# Patient Record
Sex: FEMALE | Race: WHITE
[De-identification: names, ages, dates, MRNs, and addresses within clinical notes are randomized per-mention and may not be internally consistent; named-entity substitution may affect disease eponyms.]

---

## 2018-08-06 ENCOUNTER — HOSPITAL ENCOUNTER (INPATIENT)
Dept: HOSPITAL 68 - ERH | Age: 43
LOS: 3 days | DRG: 754 | End: 2018-08-09
Attending: PSYCHIATRY & NEUROLOGY | Admitting: PSYCHIATRY & NEUROLOGY
Payer: COMMERCIAL

## 2018-08-06 VITALS — SYSTOLIC BLOOD PRESSURE: 122 MMHG | DIASTOLIC BLOOD PRESSURE: 87 MMHG

## 2018-08-06 VITALS — DIASTOLIC BLOOD PRESSURE: 60 MMHG | SYSTOLIC BLOOD PRESSURE: 110 MMHG

## 2018-08-06 VITALS — HEIGHT: 67 IN | WEIGHT: 195.12 LBS | BODY MASS INDEX: 30.62 KG/M2

## 2018-08-06 VITALS — SYSTOLIC BLOOD PRESSURE: 143 MMHG | DIASTOLIC BLOOD PRESSURE: 86 MMHG

## 2018-08-06 VITALS — SYSTOLIC BLOOD PRESSURE: 124 MMHG | DIASTOLIC BLOOD PRESSURE: 66 MMHG

## 2018-08-06 VITALS — DIASTOLIC BLOOD PRESSURE: 66 MMHG | SYSTOLIC BLOOD PRESSURE: 108 MMHG

## 2018-08-06 VITALS — SYSTOLIC BLOOD PRESSURE: 108 MMHG | DIASTOLIC BLOOD PRESSURE: 66 MMHG

## 2018-08-06 VITALS — SYSTOLIC BLOOD PRESSURE: 109 MMHG | DIASTOLIC BLOOD PRESSURE: 74 MMHG

## 2018-08-06 VITALS — DIASTOLIC BLOOD PRESSURE: 60 MMHG | SYSTOLIC BLOOD PRESSURE: 122 MMHG

## 2018-08-06 VITALS — SYSTOLIC BLOOD PRESSURE: 133 MMHG | DIASTOLIC BLOOD PRESSURE: 76 MMHG

## 2018-08-06 VITALS — SYSTOLIC BLOOD PRESSURE: 109 MMHG | DIASTOLIC BLOOD PRESSURE: 67 MMHG

## 2018-08-06 DIAGNOSIS — Z72.89: ICD-10-CM

## 2018-08-06 DIAGNOSIS — F32.9: Primary | ICD-10-CM

## 2018-08-06 LAB
ABSOLUTE GRANULOCYTE CT: 5 /CUMM (ref 1.4–6.5)
BASOPHILS # BLD: 0.1 /CUMM (ref 0–0.2)
BASOPHILS NFR BLD: 0.7 % (ref 0–2)
EOSINOPHIL # BLD: 0.2 /CUMM (ref 0–0.7)
EOSINOPHIL NFR BLD: 2.4 % (ref 0–5)
ERYTHROCYTE [DISTWIDTH] IN BLOOD BY AUTOMATED COUNT: 14 % (ref 11.5–14.5)
GRANULOCYTES NFR BLD: 50 % (ref 42.2–75.2)
HCT VFR BLD CALC: 40.2 % (ref 37–47)
LYMPHOCYTES # BLD: 4.1 /CUMM (ref 1.2–3.4)
MCH RBC QN AUTO: 31.2 PG (ref 27–31)
MCHC RBC AUTO-ENTMCNC: 33.9 G/DL (ref 33–37)
MCV RBC AUTO: 92.1 FL (ref 81–99)
MONOCYTES # BLD: 0.5 /CUMM (ref 0.1–0.6)
PLATELET # BLD: 272 /CUMM (ref 130–400)
PMV BLD AUTO: 8.2 FL (ref 7.4–10.4)
RED BLOOD CELL CT: 4.37 /CUMM (ref 4.2–5.4)
WBC # BLD AUTO: 9.9 /CUMM (ref 4.8–10.8)

## 2018-08-06 PROCEDURE — G0480 DRUG TEST DEF 1-7 CLASSES: HCPCS

## 2018-08-06 NOTE — IP CRISIS DIAG ASSESS PSYCH
Diagnostic Assessment
 
Basic Assessment
Insurance Authorization:
Insurance #1:
 
Insurance name: SARMAD MCCALL  C[employer]
Phone number: 
Policy number: [id number]
Group number: 
Authorization number: 
 
Authorization requested through the online [facility] portal and was approved.
 
Authorization # [id number]
 
Client Authorization # [id number] 
 
Type of Request INITIAL 
   
 
 
   
  
 
Primary Care Physician:
Patient's PCP: Patient Has No Primary Care Dr
PCP's Phone Number: 
 
Patient's Quote: " I think I hit my breaking point."
Present Illness:
The patient is a 43 year old,   female who presented to the ED 
last evening with worsening symptoms of depression and was intoxicated. She was 
held overnight and evaluated this AM, when her alcohol level was appropriate for
evaluation. She presents alert and oriented, significantly crying with depressed
mood. She states that she 'hit her breaking point. She describes having 
depression for her entire life, however states that she never addressed it. She 
states 3.5 years ago, she had her second son and her depression became worse. 
She states that having him "changed her whole life," and it was a significant 
decision to have him, noting she loves her son very much. She states after 
having her son, that she had brief treatment in [place] however did not 
continue, instead has been self medicating with alcohol. She states that he 
alcohol intake has increased and she believes that she is drinking too much. She
states that she is drinking 2-3 glasses of wine, 4-5 times a week, noting 
sometimes it is more then that. She reports that her depression and anxiety are 
both a 10 out of 10, 10 being the most severe. She states that she is 
overwhelmed and does not know what to do, noting that approximately a month ago,
she impulsively resigned from her job. She states that despite her financial 
responsibilities that "she did not care," and quit her job. She has been 
experiencing anhedonia and does not find kathleen in things. She states that she has 
not been able to pay her rent or any of her bills. She is unsure if she is 
feeling helpless and does know "there is a way out," however states that she 
does not know what that is at this point She has been having difficulty with 
concentration and at times, with motivation. She denies any current SI, however 
states "I feel like it yeah, but I would never do it," noting she loves her 
sons. She states that she has been having difficulty with her sleep and is only 
getting about 3-4 hours of sleep a night. She is unsure of what would be helpful
at this time, however does want to get better for her kids.
 
 
NESHA spoke with the patient friend Gerri Barth ([phone]), for collateral
information. Gerri states that she is a Licence Practicing Counselor and has
been friends with the patient for many years. Gerri states that the patient 
is a single mom and has been increasingly struggling with everything. Gerri 
states that the patient has 2 children, 3 years old and 16 years old, both of 
whom reside with her full time, however both have different fathers, who are 
involved. Gerri notes that the 16 year old is staying with her and the 3 
years old is with his paternal grandparents. Gerri notes that the children 
are well cared for and that there is no concern for abuse or neglect. Gerri 
states that the patient has been drinking more and called her last night to ask 
for help. Gerri states that she had suspected that the patient had a 
drinking problem, however states that the patient is very proud has has never 
asked for help. Gerri states that she has never known the patient to be 
suicidal or homicidal. Gerri states that the patient recently lost her job 
and is not sure if her rent or bills are paid. Gerri thinks that the patient
needs help for her drinking and that unless it is rehab, she does not think the 
patient will follow through.
Patient's Address:
[address] [place] [address]
Home Phone Number: [phone]
Other Phone Number: 
 
Who Do You Live With? Other (see notes) (2 sons)
Feel Safe Where You Live? Yes
Feel Safe in Your Relationship Yes (Not in a relationship)
Marital Status: 
Do You Have Children? Yes
Ages? 16 years old and 2.5 year
Primary Language? English
Family/Informants Interviewed: Friend- Gerri Barth [phone].
Allergies -
Coded Allergies:
shrimp (Severe, ANAPHYLAXIS 16)
amoxicillin (Intermediate, RASH 16)
morphine (Intermediate, RASH 16)
 
Current Medications -
Scheduled PRN Medications
Ibuprofen (Motrin 600 MG Tab) 600 MG TAB   1 TAB PO Q6P PRN PAIN #60 TAB
     Prescribed by Peyman Garnett MD on 09/10/15
Ondansetron (Zofran Odt) 4 MG ODT   1 ODT SL Q6P PRN NAUSEA #12 ODT
     Prescribed by Peyman Garnett MD on 09/10/15
Oxycodone HCl/Acetaminophen (Percocet 5-325 MG Tablet) 1 EACH TABLET   1-2 TAB 
PO Q4-6 PRN PAIN #24 TAB
     Prescribed by Rupert De La Fuente on 16
 
Consequences of Psych Med Use:
N/A
Comment: N/A
Lab Results:
 Laboratory Tests
 
18 0427:
Urine Opiates Screen < 100, Methadone Screen < 40, Barbiturate Screen < 60, Ur 
Phencyclidine Scrn < 6.00, Amphetamines Screen < 100, U Benzodiazepines Scrn < 
85, Urine Cocaine Screen < 50, Urine Cannabis Screen < 5.00, Urine Pregnancy 
Test NEGATIVE
 
18 0110:
Anion Gap 8, Estimated GFR > 60, BUN/Creatinine Ratio 15.0, Glucose 94, Calcium 
8.6, Total Bilirubin 0.2, AST 24, ALT 31, Alkaline Phosphatase 70, Total Protein
7.1, Albumin 4.4, Globulin 2.7, Albumin/Globulin Ratio 1.6, Lipase 168, CBC w 
Diff NO MAN DIFF REQ, RBC 4.37, MCV 92.1, MCH 31.2  H, MCHC 33.9, RDW 14.0, MPV 
8.2, Gran % 50.0, Lymphocytes % 41.4, Monocytes % 5.5, Eosinophils % 2.4, 
Basophils % 0.7, Absolute Granulocytes 5.0, Absolute Lymphocytes 4.1  H, 
Absolute Monocytes 0.5, Absolute Eosinophils 0.2, Absolute Basophils 0.1, Serum 
Alcohol 240.0
 
Toxicology Screen Completed? Yes
Results: negative
Symptoms of Use:
N/A
 
Past History
 
Past Medical History
Medical History: None/Denies
 
Past Surgical History
Surgical History CS, CHOLY tubal ligation
 
Abuse/Trauma History
Trauma History/Current Trauma: Denies
 
Legal History
Current Legal Status: none
Have you ever been arrested? Yes
Number of Arrests: 1
Pending Court Dates:
N/A
 N/A
 
Psychosocial History
Strengths/Capabilities:
The patient has supportive friends and two children that she cares for very
much.
Physical Limitations (Interventions):
None noted
 
Psychiatric Treatment History
Psych Treatment
   Psychiatric Treatment Yes
   Inpatient Treatment No
   Outpatient Treatment Yes
   Location of Treatment Brief treatment in [place]
   Reason for Treatment
Post Partum Depression
   Dates of Treatment 3.5 years ago
   Response to Treatment
She states that she only went briefly and did not continue, as she thought
 that she no longer needed treatment.
Diagnosis by History:
None noted
Risk Factors: high anxiety/distress, substance abuse, poor impulse control
 
Substance Use/Abuse History
Drug Use/Abuse minimum 12mo Hx
   Substances Used/Abused Yes
   Substance Used/Abused Alcohol
   First Use 20 or 21 years old
   Last Used Yesterday; 2018
   How much used/taken "2-3 glasses of wine, sometime more."
   How often "4-5 times a week."
   For how long Unclear
   Route of use Oral
 
Substance Abuse Treatment
Substance Abuse Treatment
   Past Substance Abuse [place] No
   Inpatient Treatment No
   Outpatient Treatment No
   Location of Treatment N/A
   Reason for Treatment
N/A
   Dates of Treatment N/A
   Response to Treatment
N/A
Comments:
N/A
 
Sexual History
Sexual Concerns:
None noted
 
Education History
Highest Level of Education: some college
Preferred Learning Style: Unknown
 
Current Mental Status
 
Mental Status
Orientation: Person, Place, Situation
Affect: Anxious (Crying), Depressed, Hopeless, Sad
Speech: WNL
Neuro-vegetative: Anhedonia, Concentration Poor, Sleep Disturbance
 
Appearance
Appearance- Dress/Hygiene:
The patient was sitting in the chair, in hospital attire with hair
disheveled and pulled back. She was crying throughout the evaluation.
 
Behaviors
Thought Process: WNL
Thought Content: WNL
Memory: WNL
Insight: WNL
 
SI/HI Risk Assessment
- Minimum 6mo History-
Past Suicidal Ideation/Attempts No
Current Suicidal Ideation/Att No
Past Homicidal Ideation/Att: No
Current Homicidal Ideation/Attempts No
Degree of Intent: She denies any history of suicide attepmts, however states, "I
feel like yeah, but I would never do it, I love my boys."
Danger To: Self
Gravely Disabled: Poor Impulse Control, Poor Judgment
Risk Factors: high anxiety/distress, substance abuse, poor impulse control
Lethality Ratin
Needs/Init [place] Plan/Goals:
Admit to the inpatient unit for safety and symptom stability. Attend group, 
family and individual sessions. Work with the provider on medication management.
Work with the treatment team, to transition back to care in the community.
AUDIT-C Questionnaire:
 
 
AUDIT-C Questionnaire: Response Value
 
ETOH use in the past year 4 or more per week 4
 
# drinks typical/day 5 or 6 2
 
6 or > drinks per occasion Weekly 3
 
Total   9
 
 
 
DSM5/PS Stressors/Medical Prob
Diagnosis' (DSM 5, Stressors, Medical):
F32.9 Unspecified Depressive Disorder
F10.20 Alcohol Use Disorder
 
Stressors: Finances, limited supports
Current GAF: 28
Comments:
N/A

## 2018-08-06 NOTE — ED PSYCH CRISIS CONSULTATION
**See Addendum**
Crisis Consult
 
Basic Assessment
Date of Consult: 18
Responsible Person/Accompanied By: Brought in by her friend
Insurance Authorization:
Insurance #1:
 
Insurance name: MICHELLE [employer] [place].
Phone number: 
Policy number: [id number]
Group number: [id number]
Authorization number: 
 
 
ED Provider:
Patient's ED Provider: Derrick LARSON,Mike HORTON
 
Primary Care Physician:
Patient's PCP: Patient Has No Primary Care Dr
PCP's Phone Number: 
 
Current Psychiatrist: None
Chief Complaint: ETOH/Drug Related Complaint
Patient's Quote: " I think I hit my breaking point."
Present Illness:
The patient is a 43 year old,   female who presented to the ED 
last evening with worsening symptoms of depression and was intoxicated. She was 
held overnight and evaluated this AM, when her alcohol level was appropriate for
evaluation. She presents alert and oriented, significantly crying with depressed
mood. She states that she 'hit her breaking point. She describes having 
depression for her entire life, however states that she never addressed it. She 
states 3.5 years ago, she had her second son and her depression became worse. 
She states that having him "changed her whole life," and it was a significant 
decision to have him, noting she loves her son very much. She states after 
having her son, that she had brief treatment in [place] however did not 
continue, instead has been self medicating with alcohol. She states that he 
alcohol intake has increased and she believes that she is drinking too much. She
states that she is drinking 2-3 glasses of wine, 4-5 times a week, noting 
sometimes it is more then that. She reports that her depression and anxiety are 
both a 10 out of 10, 10 being the most severe. She states that she is 
overwhelmed and does not know what to do, noting that approximately a month ago,
she impulsively resigned from her job. She states that despite her financial 
responsibilities that "she did not care," and quit her job. She has been 
experiencing anhedonia and does not find kathleen in things. She states that she has 
not been able to pay her rent or any of her bills. She is unsure if she is 
feeling helpless and does know "there is a way out," however states that she 
does not know what that is at this point She has been having difficulty with 
concentration and at times, with motivation. She denies any current SI, however 
states "I feel like it yeah, but I would never do it," noting she loves her 
sons. She states that she has been having diffiuctly with her sleep and is onyl 
getting about 3-4 hours of sleep a night. She is unsure of what would be helpful
at this time, however does want to get better for her kids.
 
 
NESHA spoke with the patient friend Gerri Barth ([phone]), for collateral
information. Gerri states that she is a Licence Practicing Counselor and has
been friends with the patient for many years. Gerri states that the patient 
is a single mom and has been increasingly struggling with everything. Gerri 
states that the patient has 2 children, 3 years old and 16 years old, both of 
whom reside with her full time, however both have different fathers, who are 
involved. Gerri notes that the 16 year old is staying with her and the 3 
years old is with his paternal grandparents. Gerri notes that the children 
are well cared for and that there is no concern for abuse or neglect. Gerri 
states that the patient has been drinking more and called her last night to ask 
for help. Gerri states that she had suspected that the patient had a 
drinking problem, however states that the patient is very proud has has never 
asked for help. Gerri states that she has never known the patient to be 
suicidal or homicidal. Gerri states that the patient recently lost her job 
and is not sure if her rent or bills are paid. Gerri thinks that the patient
needs help for her drinking and that unless it is rehab, she does not think the 
patient will follow through.
Patient's Address:
[address] [place] [address]
Home Phone Number: [phone]
Other Phone Number: 
 
Who Do You Live With? Other (see notes) (2 sons)
Family/Informants Interviewed: Friend- Gerri Barth [phone].
Allergies -
Coded Allergies:
shrimp (Severe, ANAPHYLAXIS 16)
amoxicillin (Intermediate, RASH 16)
morphine (Intermediate, RASH 16)
 
Current Medications -
Scheduled PRN Medications
Ibuprofen (Motrin 600 MG Tab) 600 MG TAB   1 TAB PO Q6P PRN PAIN #60 TAB
     Prescribed by Peyman Garnett MD on 09/10/15
Ondansetron (Zofran Odt) 4 MG ODT   1 ODT SL Q6P PRN NAUSEA #12 ODT
     Prescribed by Peyman Garnett MD on 09/10/15
Oxycodone HCl/Acetaminophen (Percocet 5-325 MG Tablet) 1 EACH TABLET   1-2 TAB 
PO Q4-6 PRN PAIN #24 TAB
     Prescribed by Rupert De La Fuente on 16
 
Laboratory Results:
 Laboratory Tests
 
18 0427:
Urine Opiates Screen < 100, Methadone Screen < 40, Barbiturate Screen < 60, Ur 
Phencyclidine Scrn < 6.00, Amphetamines Screen < 100, U Benzodiazepines Scrn < 
85, Urine Cocaine Screen < 50, Urine Cannabis Screen < 5.00, Urine Pregnancy 
Test NEGATIVE
 
18 0110:
Anion Gap 8, Estimated GFR > 60, BUN/Creatinine Ratio 15.0, Glucose 94, Calcium 
8.6, Total Bilirubin 0.2, AST 24, ALT 31, Alkaline Phosphatase 70, Total Protein
7.1, Albumin 4.4, Globulin 2.7, Albumin/Globulin Ratio 1.6, Lipase 168, CBC w 
Diff NO MAN DIFF REQ, RBC 4.37, MCV 92.1, MCH 31.2  H, MCHC 33.9, RDW 14.0, MPV 
8.2, Gran % 50.0, Lymphocytes % 41.4, Monocytes % 5.5, Eosinophils % 2.4, 
Basophils % 0.7, Absolute Granulocytes 5.0, Absolute Lymphocytes 4.1  H, 
Absolute Monocytes 0.5, Absolute Eosinophils 0.2, Absolute Basophils 0.1, Serum 
Alcohol 240.0
 
 
Past History
 
Past Medical History
Neurological: NONE
EENT: TUBES IN EARS
Cardiovascular: NONE
Respiratory: NONE
Gastrointestinal: NONE
Hepatic: NONE
Renal: NONE
Musculoskeletal: BROKEN R LEG 
Psychiatric: anxiety, depression
Endocrine: NONE
Blood Disorders: NONE
Cancer(s): NONE
GYN/Reproductive: TUBAL LIGATION
 
Past Surgical History
Surgical History: non-contributory
 
Psychosocial History
Strengths/Capabilities:
The patient has supportive friends and two children that she cares for very 
much.
Physical Limitations (Interventions):
None noted
 
Psychiatric Treatment History
Psych Treatment
   Psychiatric Treatment Yes
   Inpatient Treatment No
   Outpatient Treatment Yes
   Location of Treatment Brief treatment in [place]
   Reason for Treatment
Post Partum Depression
   Dates of Treatment 3.5 years ago
   Response to Treatment
She states that she only went briefly and did not continue, as she thought that 
she no longer needed treatment.
Diagnosis by History:
None noted
 
Substance Use/Abuse History
Drug Use/Abuse
   Substances Used/Abused Yes
   Substance Used/Abused Alcohol
   First Use 20 or 21 years old
   Last Used Yesterday; 2018
   How much used/taken "2-3 glasses of wine, sometime more."
   How often "4-5 times a week."
   For how long Unclear
   Route of use Oral
 
Substance Abuse Treatment
Substance Abuse Treatment
   Past Substance Abuse [place] No
   Inpatient Treatment No
   Outpatient Treatment No
   Location of Treatment N/A
   Reason for Treatment
N/A
   Dates of Treatment N/A
   Response to Treatment
N/A
Comments:
N/A
 
Current Mental Status
 
Mental Status
Orientation: Person, Place, Situation
Affect: Anxious (Crying), Depressed, Hopeless, Sad
Speech: WNL
Neuro-vegetative: Anhedonia, Concentration Poor, Sleep Disturbance
 
Appearance
Appearance- Dress/Hygiene:
The patient was sitting in the chair, in hospital attire with hair disheveled 
and pulled back. She was crying throughout the evaluation.
 
Behaviors
Thought Process: WNL
Thought Content: WNL
Memory: WNL
Insight: WNL
 
SI/HI Risk Assessment
Past Suicidal Ideation/Attempts No
Current Suicidal Ideation/Att No
Past Homicidal Ideation/Att: No
Current Homicidal Ideation/Attempts No
Degree of Intent: She denies any history of suicide attepmts, however states, "I
feel like yeah, but I would never do it, I love my boys."
Danger To: Self
Gravely Disabled: Poor Impulse Control, Poor Judgment
Risk Factors: high anxiety/distress, substance abuse, poor impulse control
Lethality Ratin
 
PTSD Checklist
PTSD Done? patient declined (Denies trauma or abuse history)
 
ED Management
Sitter: Yes
Restraints: No
 
DSM5/PS Stressors/Medical Prob
Diagnosis' (DSM 5, Stressors, Medical):
F32.9 Unspecified Depressive Disorder
F10.20 Alcohol Use Disorder
Current GAF: 28
Comments:
N/A
 
Departure
 
Disposition
Psych Medical Clearance
   Date: 18
   Medically Cleared at: 1015
   Time Started: 1015
   Time Ended: 1115
Date Disposition Established: 18
Time Disposition Established: 
Plan for Disposition -
   Modality: [facility] vs. bed search
   Contact: N/A
   Telephone: N/A
Rationale for Disposition:
The patient presents with significant symptoms of depression including depressed
mood, anhedonia, sleep disturbances, decreased concentratoin and decreased 
motivation. She has had passive suicidal thoughts, however states that she would
not act on them. The case was discussed with Dr. Solares and she believes that 
the patient would benefit from an inpatient admission. She will be admitted to 
[facility].
Type of IP Admission: Voluntary
Additional Instructions:
N/A
Referrals
Patient Has No Primary Care Dr (PCP/Family)

## 2018-08-06 NOTE — ED GENERAL ADULT
History of Present Illness
 
General
Chief Complaint: ETOH/Drug Related Complaint
Stated Complaint: DETOX
Source: patient
Exam Limitations: no limitations
 
Vital Signs & Intake/Output
Vital Signs & Intake/Output
 Vital Signs
 
 
Date Time Temp Pulse Resp B/P B/P Pulse O2 O2 Flow FiO2
 
     Mean Ox Delivery Rate 
 
08/06 1025 98.5 66 20 124/66     
 
08/06 1024 98.5 66 20 124/66  99 Room Air  
 
08/06 0830 98.6 65 20 122/60     
 
08/06 0830 98.6 65 20 122/60  100 Room Air  
 
08/06 0628 97.0 60 18 110/60  97 Room Air  
 
08/06 0552 97.0 60 18 110/60     
 
08/06 0351 96.9 90 18 133/76     
 
08/06 0340 96.9 90 18 133/76  97 Room Air  
 
08/06 0128 96.9 73 18 143/86     
 
08/06 0041 96.9 73 18 143/86  98 Room Air  
 
 
 
Allergies
Coded Allergies:
shrimp (Severe, ANAPHYLAXIS 08/09/16)
amoxicillin (Intermediate, RASH 08/09/16)
morphine (Intermediate, RASH 08/09/16)
 
Reconcile Medications
Ibuprofen (Motrin 600 MG Tab) 600 MG TAB   1 TAB PO Q6P PRN PAIN
Ondansetron (Zofran Odt) 4 MG ODT   1 ODT SL Q6P PRN NAUSEA
Oxycodone HCl/Acetaminophen (Percocet 5-325 MG Tablet) 1 EACH TABLET   1-2 TAB 
PO Q4-6 PRN PAIN
 
Triage Note:
PT TO ED REQUESTING ETOH DETOX.  STATES "I'VE BEEN
 DRINKING WAY TOO MUCH RECENTLY"  STATES HAS BEEN
 DRINKING HEAVILY FOR THE LAST YEAR.  DRINKS A
 COUPLE OF MIXED DRINKS DAILY.  LAST DRINK 1 HR
 PTA.  DENIES DRUGS.  DENIES SI/HI.  ADMITS TO
 BEING DEPRESSED.  HAS TRIED TO STOP DRINKING ON
 HER OWN MULTIPLE TIMES, HAS NOT BEEN SUCCESSFUL.
 "A LOT GOING ON"
Triage Nurses Notes Reviewed? yes
Pregnant: No
Patient currently breastfeeds: No
HPI:
Patient request help because she has been very depressed lately has been turning
to alcohol as a crutch.  Patient denies any suicidal homicidal ideations.  
Patient states that she is drinking liquid and she normally had in the past.  
Patient states that she has been able to stop drinking and has not had any 
tremors or seizures.  She denies any hallucinations.
(Derrick LARSON,Mike HORTON)
 
Past History
 
Travel History
Traveled to [place] past 21 day No
 
Medical History
Any Pertinent Medical History? see below for history
Neurological: NONE
EENT: TUBES IN EARS
Cardiovascular: NONE
Respiratory: NONE
Gastrointestinal: NONE
Hepatic: NONE
Renal: NONE
Musculoskeletal: BROKEN R LEG 
Psychiatric: anxiety, depression
Endocrine: NONE
Blood Disorders: NONE
Cancer(s): NONE
GYN/Reproductive: TUBAL LIGATION
History of MRSA: No
History of VRE: No
History of CDIFF: No
 
Surgical History
Surgical History: non-contributory
 
Psychosocial History
What is your primary language English
Tobacco Use: Current Daily Use
Daily Tobacco Use Amount/Type: => 5 Cigarettes daily
ETOH Use: heavy use
Illicit Drug Use: denies illicit drug use
 
Family History
Hx Contributory? No
(Derrick LARSON,Mike HORTON)
 
Review of Systems
 
Review of Systems
Constitutional:
Reports: no symptoms. 
EENTM:
Reports: no symptoms. 
Respiratory:
Reports: no symptoms. 
Cardiovascular:
Reports: no symptoms. 
GI:
Reports: no symptoms. 
Genitourinary:
Reports: no symptoms. 
Musculoskeletal:
Reports: no symptoms. 
Skin:
Reports: no symptoms. 
Neurological/Psychological:
Reports: see HPI, depressed. 
Hematologic/Endocrine:
Reports: no symptoms. 
Immunologic/Allergic:
Reports: no symptoms. 
All Other Systems: Reviewed and Negative
(Derrick LARSON,Mike HORTON)
 
Physical Exam
 
Physical Exam
General Appearance: well developed/nourished, alert, awake, anxious, mild 
distress
Head: atraumatic, normal appearance
Eyes:
Bilateral: PERRL, EOMI, other (SLUGGISH). 
Ears, Nose, Throat: normal pharynx, normal ENT inspection, hearing grossly 
normal
Neck: normal inspection, supple, full range of motion
Respiratory: normal breath sounds, chest non-tender, no respiratory distress, 
lungs clear
Cardiovascular: regular rate/rhythm, normal peripheral pulses
Gastrointestinal: normal bowel sounds, soft, non-tender, no organomegaly
Back: normal inspection, normal range of motion
Extremities: normal inspection, normal capillary refill, normal range of motion,
no edema
Neurologic/Psych: no motor/sensory deficits, awake, alert, oriented x 3, normal 
mood/affect
Skin: intact, normal color, warm/dry
 
Core Measures
ACS in differential dx? No
CVA/TIA Diagnosis: No
Sepsis Present: No
Sepsis Focused Exam Completed? No
(Mike Meza MD)
 
Progress
Differential Diagnoses
I considered the following diagnoses in my evaluation of the patient: [Alcohol 
intoxication, depression]
 
Plan of Care:
 Orders
 
 
Procedure Date/time Status
 
Regular Diet 08/06 B Active
 
Admit to inpatient psych 08/06 1418 Active
 
ED CRISIS PSYCH CONSULT 08/06 0120 Active
 
CIWA 08/06 0031 Active
 
URINE PREGNANCY 08/06 0031 Complete
 
URINE DRUG SCREEN FOR ER ONLY 08/06 0031 Complete
 
LIPASE 08/06 0031 Complete
 
ETHANOL 08/06 0031 Complete
 
COMPREHENSIVE METABOLIC PANEL 08/06 0031 Complete
 
CBC WITHOUT DIFFERENTIAL 08/06 0031 Complete
 
 
 Laboratory Tests
 
 
 
08/06/18 0427:
Urine Opiates Screen < 100, Methadone Screen < 40, Barbiturate Screen < 60, Ur 
Phencyclidine Scrn < 6.00, Amphetamines Screen < 100, U Benzodiazepines Scrn < 
85, Urine Cocaine Screen < 50, Urine Cannabis Screen < 5.00, Urine Pregnancy 
Test NEGATIVE
 
08/06/18 0110:
Anion Gap 8, Estimated GFR > 60, BUN/Creatinine Ratio 15.0, Glucose 94, Calcium 
8.6, Total Bilirubin 0.2, AST 24, ALT 31, Alkaline Phosphatase 70, Total Protein
7.1, Albumin 4.4, Globulin 2.7, Albumin/Globulin Ratio 1.6, Lipase 168, CBC w 
Diff NO MAN DIFF REQ, RBC 4.37, MCV 92.1, MCH 31.2  H, MCHC 33.9, RDW 14.0, MPV 
8.2, Gran % 50.0, Lymphocytes % 41.4, Monocytes % 5.5, Eosinophils % 2.4, 
Basophils % 0.7, Absolute Granulocytes 5.0, Absolute Lymphocytes 4.1  H, 
Absolute Monocytes 0.5, Absolute Eosinophils 0.2, Absolute Basophils 0.1, Serum 
Alcohol 240.0
 
Initial ED EKG: none
Hand-Off
   Endorsed To:
Peyman Garnett MD
   Endorsed Time: 0700
   Pending: consult
(Derrick LARSON,Mike HORTON)
Comments:
To be admitted to [facility] for depression
(Peyman Garnett MD)
 
Departure
 
Departure
Disposition: STILL A PATIENT
Condition: Stable
Clinical Impression
Primary Impression: Depression
Secondary Impressions: Alcohol intoxication
Referrals:
Patient Has No Primary Care Dr (PCP/Family)
 
Departure Forms:
Customer Survey
General Discharge Information
(Mike Meza MD)
 
Psych Admission Note
Psychiatric Admission:
I have seen and evaluated URIEL PADILLA
 
I have also reviewed all the pertinent lab results and diagnostic results.
 
URIEL PADILLA will be admitted to our inpatient Psychiatric unit for 
treatment and care.
 
(Tamir LARSON,Peyman)
 
Critical Care Note
 
Critical Care Note
Critical Care Time: non-applicable
(Derrick LARSON,Mike HORTON)

## 2018-08-07 VITALS — DIASTOLIC BLOOD PRESSURE: 69 MMHG | SYSTOLIC BLOOD PRESSURE: 117 MMHG

## 2018-08-07 VITALS — DIASTOLIC BLOOD PRESSURE: 74 MMHG | SYSTOLIC BLOOD PRESSURE: 137 MMHG

## 2018-08-07 VITALS — SYSTOLIC BLOOD PRESSURE: 116 MMHG | DIASTOLIC BLOOD PRESSURE: 66 MMHG

## 2018-08-07 VITALS — SYSTOLIC BLOOD PRESSURE: 117 MMHG | DIASTOLIC BLOOD PRESSURE: 69 MMHG

## 2018-08-07 VITALS — DIASTOLIC BLOOD PRESSURE: 59 MMHG | SYSTOLIC BLOOD PRESSURE: 130 MMHG

## 2018-08-07 NOTE — SOCIAL WORKER SOCIAL HX PSYCH
Social History
 
Basic Assessment
Insurance Authorization:
Insurance #1:
 
Insurance name: [facility]
Phone number: 
Policy number: [id number]
Group number: 
Authorization number: 
 
 
Curr Source of Income/Entitlements: "just quit my job"
Primary Care Physician:
Patient's PCP: Patient Has No Primary Care Dr
PCP's Phone Number: 
 
Present Problem:
The patient is a 43 year old,   female who presented to the ED 
last evening with worsening symptoms of depression and was intoxicated. She was 
held overnight and evaluated this AM, when her alcohol level was appropriate for
evaluation. She presents alert and oriented, significantly crying with depressed
mood. She states that she 'hit her breaking point. She describes having 
depression for her entire life, however states that she never addressed it. She 
states 3.5 years ago, she had her second son and her depression became worse. 
She states that having him "changed her whole life," and it was a significant 
decision to have him, noting she loves her son very much. She states after 
having her son, that she had brief treatment in [place] however did not 
continue, instead has been self medicating with alcohol. She states that he 
alcohol intake has increased and she believes that she is drinking too much. She
states that she is drinking 2-3 glasses of wine, 4-5 times a week, noting 
sometimes it is more then that. She reports that her depression and anxiety are 
both a 10 out of 10, 10 being the most severe. She states that she is 
overwhelmed and does not know what to do, noting that approximately a month ago,
she impulsively resigned from her job. She states that despite her financial 
responsibilities that "she did not care," and quit her job. She has been 
experiencing anhedonia and does not find kathleen in things. She states that she has 
not been able to pay her rent or any of her bills. She is unsure if she is 
feeling helpless and does know "there is a way out," however states that she 
does not know what that is at this point She has been having difficulty with 
concentration and at times, with motivation. She denies any current SI, however 
states "I feel like it yeah, but I would never do it," noting she loves her 
sons. She states that she has been having diffiuctly with her sleep and is onyl 
getting about 3-4 hours of sleep a night. She is unsure of what would be helpful
at this time, however does want to get better for her kids.
 
 
NESHA spoke with the patient friend Gerri Barth ([phone]), for collateral
information. Gerri states that she is a Licence Practicing Counselor and has
been friends with the patient for many years. Gerri states that the patient 
is a single mom and has been increasingly struggling with everything. Gerri 
states that the patient has 2 children, 3 years old and 16 years old, both of 
whom reside with her full time, however both have different fathers, who are 
involved. Gerri notes that the 16 year old is staying with her and the 3 
years old is with his paternal grandparents. Gerri notes that the children 
are well cared for and that there is no concern for abuse or neglect. Gerri 
states that the patient has been drinking more and called her last night to ask 
for help. Gerri states that she had suspected that the patient had a 
drinking problem, however states that the patient is very proud has has never 
asked for help. Greri states that she has never known the patient to be 
suicidal or homicidal. Gerri states that the patient recently lost her job 
and is not sure if her rent or bills are paid. Gerri thinks that the patient
needs help for her drinking and that unless it is rehab, she does not think the 
patient will follow through.
Primary Language? English
Language(s) Spoken At Home: English
 
Living Situation
Rents or Owns Home? rents
Feel Safe Where You Are Living Yes
Feel Safe in Relationships? Yes
Allergies -
Coded Allergies:
shrimp (Severe, ANAPHYLAXIS 16)
amoxicillin (Intermediate, RASH 16)
morphine (Intermediate, RASH 16)
 
Current Medications -
Scheduled PRN Medications
Ibuprofen (Motrin 600 MG Tab) 600 MG TAB   1 TAB PO Q6P PRN PAIN #60 TAB
     Prescribed by Peyman Garnett MD on 09/10/15
Ondansetron (Zofran Odt) 4 MG ODT   1 ODT SL Q6P PRN NAUSEA #12 ODT
     Prescribed by Peyman Garnett MD on 09/10/15
Oxycodone HCl/Acetaminophen (Percocet 5-325 MG Tablet) 1 EACH TABLET   1-2 TAB 
PO Q4-6 PRN PAIN #24 TAB
     Prescribed by Rupert De La Fuente on 16
 
 
Past History
 
Past Medical History
Neurological: migraine
EENT: TUBES IN EARS AS A CHILD NO CURRENT ISSUES
Cardiovascular: NONE
Respiratory: NONE
Gastrointestinal: NONE
Hepatic: NONE
Renal: NONE
Musculoskeletal: BROKEN R LEG OVER 10 YRS AGO/ NO CURRENT ISSUES 
Psychiatric: alcohol dependence, anxiety, depression
Endocrine: NONE
Blood Disorders: NONE
Cancer(s): NONE
GYN/Reproductive: TUBAL LIGATION
 
Past Surgical History
Surgical History: non-contributory
 
Birth/Family History
Birth Place/Country of Origin:
[place]
Primary Childhood Caretakers: father, mother
Family Life During Childhood:
"i had everything i needed financially but not emotionally"
DCF Involvement? No
Mother's Age (Current/Death): 62
Relationship w/Mother:
"non existant"
Father's Age (Current/Death): 65
Relationship w/Father:
"I tried to have one with him but it didnt work"
Any Sibling(s)? Yes
Sibling's Gender(s)/Age(s):
male Sibling 1:, male Sibling 2:, male Sibling 3:
Relationship w/Sibling(s):
"amazing"
Relationship w/Friends:
"good"
Family Psych/Sub Abuse/Add Hx: drug of choice
 
Abuse/Trauma History
Trauma History/Current Trauma: Denies
History of Trauma/Abuse Treatment? No
Abuse/Trauma Treatment:
N/A
 
Legal History
Legal Guardian/Address/Phone:
Self
Current Legal Status: "i have court for a traffic ticket but thats all"
Pending Court Dates:
"traffic court "
Have you ever been arrested Yes
Number of Arrests: 1
Hx of Juvenile Legal Charges? No
Hx of Adult Legal Charges? No
Civil Proceedings:
N/A
Domestic Relations Court:
N/A
Child Protective Serv Involvmnt
N/A
 N/A
 
Psychosocial History
Primary Support System: sibling(s)
Strengths/Capabilities:
The patient has supportive friends and two children that she cares for very
much.
Weaknesses:
Pt is now unemployed and is not sure how to pay bills for her residence 
Physical Limitations (Interventions):
None noted
Last Physical: Not sure
History of Seizures? No
History of Blackouts? No
ADL Limitations:
None
[place]/Social/Peer Relations
"good"
Meaningful Activities:
"I like to spend time with my kids"
Childhood Religious: Druze
Current Yarsanism Affiliation: no Mormon stated
Is Spirituality Important to You?
No
Patient's Ethnicity: [place], French
Cultural/Ethnic Issues:
N/A
Are There Developmental Issues? No
Milestones Achieved: fine motor, gross motor
 
Psychiatric Treatment History
Psych Treatment
   Inpatient Treatment No
   Outpatient Treatment Yes
   Location of Treatment Brief treatment in [place]
   Reason for Treatment
Post Partum Depression
   Dates of Treatment 3.5 years ago
   Response to Treatment
She states that she only went briefly and did not continue, as she thought
 that she no longer needed treatment.
Diagnosis:
None noted
Risk Factors: high anxiety/distress, substance abuse, poor impulse control
 
Substance Use/Abuse History
Drug Use/Abuse:Min 12 mo hx
   Substance Used/Abused Alcohol
   First Use 20 or 21 years old
   Last Used Yesterday; 2018
   How much used/taken "2-3 glasses of wine, sometime more."
   How often "4-5 times a week."
   For how long Unclear
   Route of use Oral
Have Had Periods of Sobriety? Yes
Relapse History? No
Explain:
"im not a substance user"
Have You Ever Attended AA? No
Do You Attend AA Currently? No
Do You Have a Sponsor? No
Other Community Resources Used:
N/A
Symptoms of Use:
N/A
 
Substance Abuse Treatment
Substance Abuse Treatment
   Inpatient Treatment No
   Outpatient Treatment No
   Location of Treatment N/A
   Reason for Treatment
N/A
   Dates of Treatment N/A
   Response to Treatment
N/A
 
Sexual History
Sexually Active No
Sexual Orientation Heterosexual
Sexual Concerns:
None noted
 
Education History
Highest Level of Education: some college
Highest Grade Completed: A few years of college
Vocational Year Completed: N/A
Number of College Years: 2
College Degree/Major: None
Other Degree(s): None
Preferred Learning Style: visual, auditory, experiential
HX of Learning Difficulties: None reported
Barriers to Learning: None reported
Special Communication Needs: None reported
 
Employment History
Employment Unemployed
Not in Labor Force: "Just quit my job for no reason"
Vocation/Occupational Hx: "I worked for [employer]"
No. of Jobs in Last 5 Years: 2
Attendance: Above average
Performance: Exemplary
Comments:
"i was a great worker"
 
 History
Have You Been in The ? Yes
If Yes, Explain:
From [id number]
Type of Discharge: Honorable
Date of Discharge: 
 
 
Current Mental Status
 
Mental Status
Orientation: Person, Place, Situation
Affect: Anxious (Crying), Depressed, Sad
Speech: WNL
Neuro-vegetative: Anhedonia, Concentration Poor, Sleep Disturbance
 
Appearance
Appearance- Dress/Hygiene:
The patient was sitting in the chair, in hospital attire, Pt appears to be well 
groomed and attending to ADL's
 
Behaviors
Thought Process: WNL
Thought Content: WNL
Memory: WNL
Insight: WNL
 
SI/HI Risk Assessment
Past Suicidal Ideation/Attempts No
Current Suicidal Ideation/Att No
Past Homicidal Ideation/Att: No
Current Homicidal Ideation/Attempts No
Degree of Intent: She denies any history of suicide attepmts, however states, "I
feel like yeah, but I would never do it, I love my boys."
Danger To: Self
Gravely Disabled: Poor Impulse Control, Poor Judgment
Risk Factors: SA/MH Hospitalization(s), Lack of concern outcome, Poor impulse 
control, Substance Abuse
Lethality Ratin
- Conclusion and Recommendations for treatment
- and discharge planning

## 2018-08-07 NOTE — HISTORY & PHYSICAL
General Information and HPI
History of Present Illness:
This young female was admitted to the hospital because of increasing depression 
and alcohol abuse.  She denies any previous history of psychiatric treatment or 
psychiatric hospital admissions in the past.  She reports that she has been 
feeling too depressed and has been drinking too much so a friend of hers who is 
a therapist brought her to the hospital for evaluation and admission for 
increased depression
Her past history is only significant for 2  sections and cholecystectomy
in the past and she denies any other ongoing major medical problems or any 
psychiatric illness in the past.  She is single presently but was  in the
past and has 2 children who are 4 and 16 years old and are in good health.  She 
denies any specific family history in her parents or siblings but claims her 
grandparents on both sides have diabetes.  She is a medical technician and a 
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
she has lost about 30 pounds of weight by dieting in last 4 months or so
 
Allergies/Medications
Allergies:
Coded Allergies:
shrimp (Severe, ANAPHYLAXIS 16)
amoxicillin (Intermediate, RASH 16)
morphine (Intermediate, RASH 16)
 
Home Med list
Ibuprofen (Motrin 600 MG Tab) 600 MG TAB   1 TAB PO Q6P PRN PAIN
Ondansetron (Zofran Odt) 4 MG ODT   1 ODT SL Q6P PRN NAUSEA
Oxycodone HCl/Acetaminophen (Percocet 5-325 MG Tablet) 1 EACH TABLET   1-2 TAB 
PO Q4-6 PRN PAIN
 
 
Past History
 
Travel History
Traveled to [place] past 21 day No
 
Medical History
Neurological: migraine
EENT: TUBES IN EARS AS A CHILD NO CURRENT ISSUES
Cardiovascular: NONE
Respiratory: NONE
Gastrointestinal: NONE
Hepatic: NONE
Renal: NONE
Musculoskeletal: BROKEN R LEG OVER 10 YRS AGO/ NO CURRENT ISSUES 
Psychiatric: alcohol dependence, anxiety, depression
Endocrine: NONE
Blood Disorders: NONE
Cancer(s): NONE
GYN/Reproductive: TUBAL LIGATION
History of MRSA: No
History of VRE: No
History of CDIFF: No
Isolation History: Standard
 
Surgical History
Surgical History: non-contributory
 
Past Family/Social History
 
Psychosocial History
Where do you live? Home
ETOH Use: heavy use
Illicit Drug Use: denies illicit drug use
 
Review of Systems
 
Review of Systems
Constitutional:
Denies: no symptoms. 
EENTM:
Denies: no symptoms. 
Cardiovascular:
Denies: no symptoms. 
Respiratory:
Denies: no symptoms. 
GI:
Denies: no symptoms. 
Genitourinary:
Denies: no symptoms. 
Musculoskeletal:
Denies: no symptoms. 
Skin:
Denies: no symptoms. 
Neurological/Psychological:
Reports: see HPI, depressed, emotional problems. 
Hematologic/Endocrine:
Denies: no symptoms. 
 
Exam & Diagnostic Data
Last 24 Hrs of Vital Signs/I&O
 Vital Signs
 
 
Date Time Temp Pulse Resp B/P B/P Pulse O2 O2 Flow FiO2
 
     Mean Ox Delivery Rate 
 
 1214  56  137/74     
 
 0741 97.1 50  119/66     
 
 0741 97.1 50  116/66     
 
/ 2201 97.5 51  109/67     
 
/ 1953 97.4 56  108/66     
 
/ 1952 97.4 56  108/66     
 
/ 1847  54  109/74     
 
 1657 98.1 68  122/87     
 
 1656 98.1 68  122/87     
 
/ 1645  72 20 109/58  96 Room Air  
 
 1633 98.4        
 
 1437 98.1 55 18 120/63  98   
 
 
 Intake & Output
 
 
  1600  0800  0000
 
Intake Total   
 
Output Total   
 
Balance   
 
    
 
Patient   195 lb
 
Weight   
 
 
 
 
Physical Exam
General Appearance Alert, Oriented X3, Cooperative, No Acute Distress
Skin No Rashes, No Breakdown, No Significant Lesion
HEENT Atraumatic, PERRLA, EOMI, Mucous Membr. moist/pink
Neck Supple, No JVD, No thryomegaly, +2 Carotid Pulse wo Bruit
Lymphatic Cervical nl
Cardiovascular Regular Rate, Normal S1, Normal S2, No Murmurs, Gallops, Rubs
Lungs Clear to Auscultation, Normal Air Movement
Abdomen Normal Bowel Sounds, Soft, No Tenderness, No Hepatospenomegaly, No 
Masses
Neurological
   Exam Findings: Normal Gait, Normal Speech, Strength at 5/5 X4 Ext, Normal 
Tone, Cranial Nerves 3-12 NL, Reflexes 2+
   Cranial Nerves II through XII:
wnl
Extremities No Clubbing, No Cyanosis, No Edema, No Tenderness/Swelling
 
Assessment/Plan
Assessment:
This young female was admitted to [facility] for the first time because 
of increasing depression and alcohol abuse.  She does not have any previous 
history of any long-term psychiatric problems.  From medical standpoint she is 
fairly stable without any acute medical problems at this time.  Her physical 
exam is stable and there is no acute illness.  Her labs show normal CBC normal 
electrolytes and renal functions as well as liver functions and her admission 
alcohol level was 240 at about 3 times the legal limit.  She does not need any 
specific medical workup or treatment at this time except her usual multivitamin 
and thiamine and folate for protocol.
 
As Ranked By This Provider
Problem List:
 1. Depression
 
 2. Alcohol intoxication
 
 
Miscellaneous
 
Miscellaneous Documentation
Attending Case Discussed With:
Eris LARSON,Jonha
 
Primary Care Physician:
Patient Has No Primary Care Dr
 
Patient sees these Specialists
None
Level of Patient Care: [facility]
 
Attending MD Review Statement
 
Attending Statement
Attending MD Statement: examined this patient, reviewed EMR data (avail), 
discussed with nursing
Attending Assessment/Plan:
This young female was admitted because of increasing depression and alcohol 
abuse.  From medical standpoint she is stable without any acute medical problem 
and she does not require any specific treatment or workup from medical 
standpoint except her usual multivitamin and folate and thiamine per protocol.

## 2018-08-07 NOTE — CPS PROVIDER INIT ASMT PSYCH
Psychiatric Admission
Crisis Worker's Note Reviewed: Yes
Patient Seen and Examined: Yes (Seen with medical student.)
Identifying Information:
42 yo SWF mother of 2 who was admitted on 18 on a voluntary basis, referred 
by  ER.
Chief Complaint:
"I think I pretty much hit my breaking point."
Severe depression/alcohol use.
Reaction to Hospitalization:
"Scared because I've never done this before."
 
History of Present Illness
Onset of Illness:
Chronic but worse x ~4 years, since second son was born.
Circumstances Leading to Admission:
Precipitously quit job on 18.
Drinking heavier x 3-4 months, ~3-4 glasses wine/night, 4+ nights/week.
 
Problem(s) Justifying Need for Admission:
Severe depression.
Drinking.
Other HPI:
"I'm definitely not me."
Second pregnancy was not planned.
Reports lots of crying/feeling sad.
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  "Was just overwhelmed and 
stressed."
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 
teaches.
 
Sleep: awful x 5-6 months.
Coffee use down to 2 cups in AM.
Appetite: so-so.
Intentional weight loss.
.
Energy: decreased for a couple of months.
 
Denies gambling, promiscuity, hypergraphia, hyper-religiosity.  Reports a small 
amount of debt.  Gets mood swings but not natural highs.
 
Case and treatment plan discussed in team meeting.  Staff reports that the 
patient is denying SI.  Had fair sleep.  Looks depressed.  CIWA unremarkable.  
VSS.  Anxious
 
 
Past Psychiatric History
Past Diagnosis(es)- if any:
N/A.
Past Precipitating Factors- if any:
N/A.
- Include inpatient and outpatient treatment
Treatment History:
[facility] for counseling in [place] for 2 visits, 2.5 years ago.
No prior inpatient tx.
History of Suicide Attempts or Gestures
No attempts or self-harm.
Substance Abuse History:
Tobacco: 0.5 ppd.
Alcohol as above.
Denies DTs or seizures.
No drug use.
Allergies:
Coded Allergies:
shrimp (Severe, ANAPHYLAXIS 16)
amoxicillin (Intermediate, RASH 16)
morphine (Intermediate, RASH 16)
 
Home Med List:
None.
- Include any medical condition(s) that may
- impact the patient's recovery/remission
Past Medical History:
2 c-sections.
Cholecystectomy.
BTL.
Right leg surgery after injury.
 
Past History
 
Medical History
Neurological: migraine
EENT: TUBES IN EARS AS A CHILD NO CURRENT ISSUES
Cardiovascular: NONE
Respiratory: NONE
Gastrointestinal: NONE
Hepatic: NONE
Renal: NONE
Musculoskeletal: BROKEN R LEG OVER 10 YRS AGO/ NO CURRENT ISSUES 
Psychiatric: alcohol dependence, anxiety, depression
Endocrine: NONE
Blood Disorders: NONE
Cancer(s): NONE
GYN/Reproductive: TUBAL LIGATION
History of MRSA: No
History of VRE: No
History of CDIFF: No
Isolation History: Standard
 
Surgical History
Surgical History: CS, CHOLY tubal ligation
 
Psychiatric Family/Social Hx
 
Family History
Psychiatric Illness:
Denied.
Substance Use:
Father alcohol.
PGF alcohol.
Mat uncle  from cocaine and methadone OD when the patient was 26 yo.
Suicides:
Denied.
 
Social History
Living Situation:
Lives with 2 sons, ages 16 and 3, different fathers.
Significant Relationships (family/friends):
Has two sons, ages 16 and 3, as above.
Parents  when patient was 1.4 yo.
Mother remarried, lives in [place], [facility]. contact.
Father lives in [place], no contact x 1 year, ("mean drunk").
Has 3 maternal half-brothers.
Education:
Had 2 years of college.  Wants to finish nursing.
Vocation/Occupation:
Quit job as cardiac/med tech.
No income now.
Legal:
No arrests.
 
Healthly Behaviors Screening
 
Tobacco Screening
Tobacco Use from ED Docu: Current Daily Use
Daily Tobacco Use Amount/Type: => 5 Cigarettes daily
- If tobacco counseling indicated
- the following topics are required.
- #1 Recognizing dangerous situations.
- #2 Coping Skills.
- #3 Basic information about quitting.
Status of Tobacco Cessation Counseling: #1, #2 AND #3 Completed
Cessation Med Status Nicotine Patch Ordered
 
Alcohol Screening
- ETOH screen POS if BAL >=80 or Audit-C>= M4/F3
Audit-C Score from Diag Assess: 9
Blood Alcohol Level:
Laboratory Tests
 
 
 
 
 0110
 
Toxicology 
 
  Serum Alcohol (<10 MG/DL) 240.0
 
 
 
Alcohol Use Screening Results: Pos per Audit C &/or BAL
- If ETOH counseling indicated
- the following topics are required.
- #1 Express concern about the patient's
- drinking at unhealthy levels, include informing
- of national norms for moderate drinking:
- men <= 14 drinks/week, max 4 drinks/occasion
- women <= 7 drinks/week, max 3 drinks/occasion
- #2 Providing feedback, including linking alcohol to
- negative physical effects (liver injury, hypertension)
- negative emotional effects (relationship problems and
- depression)
- negative occupational consequences (reduced work
- performance)
- #3 Advising the patient to abstain from alcohol or
- to drink below national norms for moderate drinking
- (as listed above).
Status of ETOH Use Counseling: #1, #2 AND #3 Completed.
 
Metabolic Screening
- Screen if on a Neuroleptic Medication
- Metabolic screening should include:
- Blood Pressure, BMI, Glucose or Hgb A1c, & a
- Lipid profile from within the past 365 days.
Metabolic Screening
([x]) Not Applicable, patient not on a neuroleptic.
 
 OR
 
() Patient on a neuroleptic(s) .
     Enter below results for Hemoglobin A1C, 
     and lipid panel if obtained during the last 365 days.
 
BMI: 30.500     
 
Blood Pressure: 130/59
 
Laboratory Results From [facility] (If applicable):
 
 
 
Exam and Plan
 
Mental Status Examination
Ambulation Status:
Gait unremarkable.
Appearance:
WF in NAD.
Attitude towards examiner:
Calm, polite and cooperative.
Psychomotor activity:
There is no psychomotor agitation/retardation.
Behavior:
Unremarkable.
Quality of speech:
Normal in volume, rate and tone.
Affect:
Calm and blunted.
Mood:
Sad 10/10.
Anxiety 10/10 since getting here.
Finds it overwhelming to be here.
Denies feeling hopeless or worthless.
Does feels helpless and guilty.
Suicidal Ideation:
Denies active and passive SI.
Homicidal Ideation:
Denies HI.
Hallucinations:
Denies AH and VH.
Paranoid/Delusional Material:
Denies PI and magical perla.
Difficulties with thought organization:
None.
Insight:
Fair.
Judgment:
Was poor but improved.
Orientation:
Ox3.
Cognition:
Grossly intact.
Memory Function:
Grossly intact.
Estimate of intellectual functioning:
Average.
 
Assets/Strengths
Patient Identified Assets/Strengths:
"Here and want to make things better."
 
Impression/Plan
Impression and Plan:
Patient is here in the context of severe depression, alcohol use and recently 
having quit job.  Has been feeling overwhelmed lately.
- Include all active medical diagnosis that require tx
DSM 5 Diagnosis(es):
Unspecified depression.
Alcohol use disorder.
- Initial Tx Plan for Active Psych & Medical Conditions
Treatment Plan:
The patient will be monitored on the unit for safety, alcohol withdrawal and 
mood disorder.
 
Additional information is needed from collaterals.
 
Major risks/benefits of Lexapro were discussed with the patient and she agreed 
to this medication.  Patient was advised to avoid drugs and alcohol while on 
this medication.
 
Anticipate likely discharge on 18 to [facility] and home.
- Factors that would help patient function
- in a less restrictive setting.
Factors:
Improved mood.
Not suicidal.
Not in alcohol withdrawal.

## 2018-08-07 NOTE — SOCIAL WORKER PROG NOTE PSYCH
Social Work Progress Note
Progress Note
This writer met with patient.  She reported that she came to the hospital as "I 
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 
depression and anxiety).  Patient stated that she has been drinking alcohol 4-5 
times per week, at 3-4 glasses each time.  Patient denied any other substance 
use and denied any history of mental health/substance use treatment.  Patient 
stated that she has one arrest due to driving and talking on a cell phone.  She 
denied any DCF involvement.  She denied SI/HI/hallucinations.  She identified 
stressors of being a single parent as well as her alcohol use.  Patient stated 
that she has two children; the 16 year old is staying with her friend and the 3 
years old is staying his paternal grandparents.  Patient was agreeable to a 
family meeting, however, did not want to identify anyone today for the family 
meeting.  She stated that she would think about it and possibly contact 
individuals.  She will inform this writer of whom she would like to invite.
 
Patient is agreeable to [facility].  She stated that she has also attended AA in the
past and is interested in returning.  Patient requested that we call the [place] 
Court as she remembered that she had court today for the driving arrest.  Alcira 
at the [place] 's office ([phone]) informed that she had been 
given a continuance with the next court date on 8/28/18.  Alcira stated that a 
letter or other documentation did not need to be submitted today and instructed 
the patient to bring discharge paperwork with her on 8/28/18.

## 2018-08-08 VITALS — DIASTOLIC BLOOD PRESSURE: 70 MMHG | SYSTOLIC BLOOD PRESSURE: 118 MMHG

## 2018-08-08 VITALS — SYSTOLIC BLOOD PRESSURE: 131 MMHG | DIASTOLIC BLOOD PRESSURE: 64 MMHG

## 2018-08-08 VITALS — SYSTOLIC BLOOD PRESSURE: 127 MMHG | DIASTOLIC BLOOD PRESSURE: 70 MMHG

## 2018-08-08 NOTE — CP SOUTH PROGRESS NOTE PSYCH
Psych (Inpt) Progress Note
Progress Note
Include the following elements, when applicable:
Involvement in the active treatment of the patient with behavioral observations 
of the patient and the patient's response to the treatment.
Review of the ongoing treatment process in the context of the treatment plan.
Indication of how multi-disciplinary staff members are carrying out the 
treatment plan.
Plans for future interventions and recommendations for revision of the treatment
plan.
Liaison with other physicians/providers.
Progress Note:
Case and treatment plan discussed in team meeting.  Staff reports that the 
patient is denying suicidal ideation.  Patient was isolative last evening.  On 
the periphery.  CIWA scores have been unremarkable.
 
Patient seen at 10:52 AM.  She was in group prior to meeting with me in office. 
Reports doing okay.  Misses her children.  Appears awake and alert.  Affect is 
calm and blunted.  Patient reports that her trigger on Sunday was that she told 
her son she was going to the grocery store but went to a bar instead.  She is 
puzzled by the fact that she quit her job impulsively and she feels ashamed and 
embarrassed about it.  Reports mood as "I don't know.  It's kind of mixed, upset
/frustrated with myself."  She is apprehensive about discharge tomorrow and 
seeing her older son because she will be embarassed.  Rates sad mood about 10/10
and anxiety about 8/10.  Denies hopeless, helpless or worthless.  Does feel 
guilty.  Denies active and passive suicidal ideation.  Denies homicidal 
ideation.  Denies auditory and visual hallucinations and paranoid ideation.  
Reports she slept maybe 2 hours.  She tried to sleep.  Did not take trazodone.  
She plans to try trazodone tonight.  States appetite is not so much.  Energy is 
a little bit better than yesterday.  Tolerating Lexapro well thus far without 
complaint.
 
Patient's asking for medication to help with alcohol cravings.  Major risks and 
benefits of Campral were discussed with the patient and she agrees to this 
medication.  She was advised to avoid drugs, alcohol and pregnancy while on this
medication.
 
IMPRESSION: 
Slow progress.  Continue present treatment plan.  Plan is for family meeting 
this afternoon.  Anticipate likely discharge tomorrow to IOP intake and return 
home.

## 2018-08-08 NOTE — SOCIAL WORKER PROG NOTE PSYCH
Social Work Progress Note
Progress Note
This writer met with patient.  She discussed feeling anxious/nervous about 
discharge.  She was agreeable to an IOP intake and accepted the 1:15pm intake 
time for tomorrow.  She denied SI/HI/AH/VH.  She stated that she has obtained 
food stamps and is interested in case management through [facility].  We called 
Tasha at [facility] shruthi ([phone]) and left a vm.  (Tasha returned the 
call later in the day stating that the patient would need to complete the  IOP
before submitting a referral to [facility] case management.  Patient was informed 
of this.)  We discussed AA, to which patient was agreeable to attending and will
ask the nursing staff for a meeting book.  Patient is agreeable to a family 
meeting with her friend, Gerri, whom we called and was agreeable to a 4:30pm
meeting.
 
Patient's brother, cousin and two friends (including Gerri) presented to [facility]
for the family meeting.  This writer spoke with patient separately, who was 
agreeable to all of these individuals attending the meeting.  PARVIZ was signed.
 
4:30pm
Dr. Schulte and this writer met with the patient and family/friends for a family 
meeting (friends Walter Barth and Gerri Barth; brother, Momo Bejarano;
cousin, Limaemilie Carrion).  They discussed concerns regarding patient and Lima 
commented on the recent increase in alcohol use, an amount patient appears to 
minimize.  Patient's family and friends inquired about inpatient/rehab.  Patient
appeared resistant to this stating multiple reasons (including not wanting to be
away from her children and looking for a job).  They were informed that 
referrals could be made, if the patient is interested, and if there is a wait, 
patient could attend [facility] during this wait.  Patient agreed to consider this and 
discuss further tomorrow or with [facility].  Dr. Schulte addressed medication questions 
and concerns.  We also discussed IOP and shared that the patient has an intake 
scheduled for tomorrow at 1:15pm with [facility].  Patient and her family/friends 
were informed of the response regarding case managment at [facility].  Patient's 
family did not express concerns regarding SI/HI.  Patient denied any access to 
weapons/guns, to which her family and friends agreed.

## 2018-08-09 VITALS — SYSTOLIC BLOOD PRESSURE: 128 MMHG | DIASTOLIC BLOOD PRESSURE: 59 MMHG

## 2018-08-09 VITALS — DIASTOLIC BLOOD PRESSURE: 59 MMHG | SYSTOLIC BLOOD PRESSURE: 128 MMHG

## 2018-08-09 NOTE — PATIENT DISCHARGE INSTRUCTIONS
Psych Discharge Inst
 
General Discharge Information
Reason for Admission:
Severe depression and alcohol use.
Psy Discharge Primary Diag+ Unspecified depression
Psy Discharge Secondary Diag+ Alcohol use disorder
Summary Tests/Major Procedures
 Lab
 
 
ALT 31 U/L 08/06/18 0110
 
AST 24 U/L 08/06/18 0110
 
BUN 9 mg/dL 08/06/18 0110
 
Calcium 8.6 mg/dL 08/06/18 0110
 
Carbon Dioxide 25 mmol/L 08/06/18 0110
 
Chloride 110 mmol/L H 08/06/18 0110
 
Creatinine 0.6 mg/dL 08/06/18 0110
 
Estimated GFR > 60 ml/min 08/06/18 0110
 
Glucose 94 mg/dL 08/06/18 0110
 
Lipase 168 U/L 08/06/18 0110
 
Potassium 4.2 mmol/L 08/06/18 0110
 
Sodium 144 mmol/L 08/06/18 0110
 
TSH &T3 &Free T4 Intrp 2.100 uIU/mL 08/06/18 0110
 
Absolute Lymphocytes 4.1 /CUMM H 08/06/18 0110
 
Hct 40.2 % 08/06/18 0110
 
Hgb 13.6 G/DL 08/06/18 0110
 
MCH 31.2 PG H 08/06/18 0110
 
Plt Count 272 /CUMM 08/06/18 0110
 
WBC 9.9 /CUMM 08/06/18 0110
 
Serum Alcohol 240.0 MG/DL 08/06/18 0110
 
Urine Pregnancy Test NEGATIVE 08/06/18 0427
 
 
Studies Pending at DC:
None.
 
Patient Instructions
Contact Information
Your Psychiatrist on [facility] was Jonah Schulte MD
 
* If you are experiencing an emergency related to this hospitalization, please 
call [phone] to contact the treating psychiatrist or the psychiatrist-on-
call.
 
* To Request a copy of your medical records, please contact the Medical Records 
Department at [phone].
 
* To request results of studies pending at the time of discharge, please call 
[phone].
 
* Continue your Medications until directed to stop by your Healthcare provider.
 
General Medication Information
Please continue to take your new medications and your continued home medications
, unless otherwise indicated on your discharge medication list, or unless 
directed by your MD or APRN to stop them.
 
 
Special Instructions
Diet Regular
Activity Normal
Other Inst/Recommendations Stay away from alcohol!  Pursue inpatient rehab.
- Tobacco Use Treatment Offered
Post DC Medications Offered: Script Given-See Med List
Post DC Tobacco Treatment Plan: Mikey Tobacco Tx Pgm
Program Appt Date: 08/22/18
Program Appt Time: 1600
- EtOH/Drug Use D/O Treatment Offered
Post DC Medications Offered: Script Given-See Med List (Campral)
Post DC EtOH/SubAbuse TX Plan: Mikey SubAbuse/Dual IOP
Program Appt Date: 08/09/18
Program Appt Time: 1315
Metabolic Screening
([x]) Not Applicable, patient not on a neuroleptic.
 
 OR
 
() Patient on a neuroleptic(s) .
     Enter below results for Hemoglobin A1C, 
     and lipid panel if obtained during the last 365 days.
 
BMI: 30.500     
 
Blood Pressure: 128/59
 
Laboratory Results From [facility] (If applicable):
 
 
 
Advance Directives
Does the Patient have Medical Advance Directives No/Refused further info
Does Pt have Psychiatric Advance Directives? No/Refused further info
Does Patient have a Designated Surrogate Decision Maker: No
Information About Psychiatric Advance Directives Provided? Refused
 
Discharge Plan
Post Hospital Treatment Plan:
Returning to home and family.

## 2018-08-09 NOTE — CP SOUTH PROGRESS NOTE PSYCH
Psych (Inpt) Progress Note
Progress Note
Include the following elements, when applicable:
Involvement in the active treatment of the patient with behavioral observations 
of the patient and the patient's response to the treatment.
Review of the ongoing treatment process in the context of the treatment plan.
Indication of how multi-disciplinary staff members are carrying out the 
treatment plan.
Plans for future interventions and recommendations for revision of the treatment
plan.
Liaison with other physicians/providers.
Progress Note:
Case and treatment plan discussed in team meeting.  Staff reports that the 
patient is denying suicidal ideation.  Has an IOP intake at 1:15 PM.  Had a good
family meeting yesterday.  Patient is now interested in a 30 day inpatient 
rehab.
 
I attended family meeting yesterday, and we discussed Antabuse, but the patient 
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 
work as a medical technician.
 
Patient seen at 10:39 AM.  Feels okay, a little nervous about going home.  She 
believes that a 2-4 week inpatient rehab is the right choice.  Affect is blunted
to mildly anxious.  Mood is anxious like at a 9/10.  Rates sad mood like a 4/10.
 Denies feeling hopeless, helpless or worthless.  Does feel guilty.  Denies 
active and passive suicidal ideation.  Denies homicidal ideation.  Denies 
auditory and visual hallucinations and paranoid ideation.  Reports she slept 
wonderfully with trazodone, the best in months.  Energy is so-so.  Tolerating 
medications well except for report of mild dry mouth.  Feels ready and safe for 
discharge.
 
IMPRESSION:
Condition improved.  Okay for discharge today to IOP intake then to home.

## 2018-08-09 NOTE — DISCHARGE SUMMARY REPORT-PSYCH
Visit Information
 
Visit Dates/Diagnosis'
Admission Date:
08/06/18
 
Discharge Date: 08/09/18
Reason for Admission:
Severe depression and alcohol use.
Psy Discharge Primary Diag: Unspecified depression
Psy Discharge Secondary Diag: Alcohol use disorder
 
Hospital Course
Significant Lab Findings:
Lab
 
 
ALT 31 U/L 08/06/18 0110
 
AST 24 U/L 08/06/18 0110
 
BUN 9 mg/dL 08/06/18 0110
 
Calcium 8.6 mg/dL 08/06/18 0110
 
Carbon Dioxide 25 mmol/L 08/06/18 0110
 
Chloride 110 mmol/L H 08/06/18 0110
 
Creatinine 0.6 mg/dL 08/06/18 0110
 
Estimated GFR > 60 ml/min 08/06/18 0110
 
Glucose 94 mg/dL 08/06/18 0110
 
Lipase 168 U/L 08/06/18 0110
 
Potassium 4.2 mmol/L 08/06/18 0110
 
Sodium 144 mmol/L 08/06/18 0110
 
TSH &T3 &Free T4 Intrp 2.100 uIU/mL 08/06/18 0110
 
Absolute Lymphocytes 4.1 /CUMM H 08/06/18 0110
 
Hct 40.2 % 08/06/18 0110
 
Hgb 13.6 G/DL 08/06/18 0110
 
MCH 31.2 PG H 08/06/18 0110
 
Plt Count 272 /CUMM 08/06/18 0110
 
WBC 9.9 /CUMM 08/06/18 0110
 
Serum Alcohol 240.0 MG/DL 08/06/18 0110
 
Urine Pregnancy Test NEGATIVE 08/06/18 0427
 
 
 
Course
Complications:
None.
Consultations:
The patient was seen by Dr. Rodriguez Vance for admission history and physical.  Per
his note of 8/7/18:
"Assessment:
This young female was admitted to [facility] for the first time because 
of increasing depression and alcohol abuse.  She does not have any previous 
history of any long-term psychiatric problems.  From medical standpoint she is 
fairly stable without any acute medical problems at this time.  Her physical 
exam is stable and there is no acute illness.  Her labs show normal CBC normal 
electrolytes and renal functions as well as liver functions and her admission 
alcohol level was 240 at about 3 times the legal limit.  She does not need any 
specific medical workup or treatment at this time except her usual multivitamin 
and thiamine and folate for protocol."
 
Allergies:
Coded Allergies:
shrimp (Severe, ANAPHYLAXIS 08/09/16)
amoxicillin (Intermediate, RASH 08/09/16)
morphine (Intermediate, RASH 08/09/16)
 
Hospital Course/TX Response:
The patient was monitored on the unit for safety, alcohol withdrawal and mood 
disorder.  She participated in multimodal treatments on the unit.
 
The patient was treated with thiamine, folate and multivitamin.  Detox was 
uneventful.
 
The patient was started on Lexapro for depression.  She was started on Campral 
for alcohol cravings.  She found prn gabapentin and prn trazodone helpful.
 
There has been no suicidal ideation.
 
Mood and affect have improved.
 
Progress note from date of discharge, 8/9/18:
"Case and treatment plan discussed in team meeting.  Staff reports that the 
patient is denying suicidal ideation.  Has an IOP intake at 1:15 PM.  Had a good
family meeting yesterday.  Patient is now interested in a 30 day inpatient 
rehab.
 
I attended family meeting yesterday, and we discussed Antabuse, but the patient 
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 
work as a medical technician.
 
Patient seen at 10:39 AM.  Feels okay, a little nervous about going home.  She 
believes that a 2-4 week inpatient rehab is the right choice.  Affect is blunted
to mildly anxious.  Mood is anxious like at a 9/10.  Rates sad mood like a 4/10.
 Denies feeling hopeless, helpless or worthless.  Does feel guilty.  Denies 
active and passive suicidal ideation.  Denies homicidal ideation.  Denies 
auditory and visual hallucinations and paranoid ideation.  Reports she slept 
wonderfully with trazodone, the best in months.  Energy is so-so.  Tolerating 
medications well except for report of mild dry mouth.  Feels ready and safe for 
discharge.
 
IMPRESSION:
Condition improved.  Okay for discharge today to IOP intake then to home."
 
Discharge HBIPS
- Tobacco Use Treatment Offered
Post DC Medications Offered: Script Given-See Med List
Post DC Tobacco Treatment Plan: [facility] Tobacco Tx Pgm
Program Appt Date: 08/22/18
Program Appt Time: 1600
- EtOH/Drug Use D/O Treatment Offered
Post DC Medications Offered: Script Given-See Med List (Campral)
Post DC EtOH/SubAbuse TX Plan: [facility] SubAbuse/Dual IOP
Program Appt Date: 08/09/18
Program Appt Time: 1315
 
Metabolic Screening
- Screen if on a Neuroleptic Medication
- Metabolic screening should include:
- Blood Pressure, BMI, Glucose or Hgb A1c, & a
- Lipid profile from within the past 365 days.
Metabolic Screening
([x]) Not Applicable, patient not on a neuroleptic.
 
 OR
 
() Patient on a neuroleptic(s) .
     Enter below results for Hemoglobin A1C, 
     and lipid panel if obtained during the last 365 days.
 
BMI: 30.500     
 
Blood Pressure: 128/59
 
Laboratory Results From [facility] (If applicable):
 
 
 
Discharge Instructions
 
General Discharge Information
Multiple Neuroleptics:
([x]) Not Applicable
      
     OR
   
 Document below three failed attempts at monotherapy, or a plan to taper to 
 monotherapy, or augmentation of Clozapine.
 ()
 
Discharge Diet Regular
Discharge Activity Normal
DC Disposition:
Home and IOP.
Patient will pursue inpatient rehab.
Referrals
Ordered Referrals
[facility] 08/09/18
[address], [place] [id number]
[phone]
 
[facility]  
[address], [place]  
[phone]  
  
IOP intake: Thursday, 8/9/18, at 1:15pm
 
[facility] 08/22/18
[address] [place] [address]
[phone]
 
[facility]  
[address], [place]  
[phone]  
  
Group meets every other Wednesday at 4pm  
  
**Next group: Wednesday, 8/22/18, at 4pm
 
Provider Referral
For Groups:
[Resources]
 
Reynaldo Lee ([place]): [phone]  
[facility], [place]): [phone]  
[facility]):   
[phone]
 
 
 
Prescriptions
Stop taking the following medications:
Ibuprofen (Motrin 600 MG Tab) 600 MG TAB ORAL EVERY SIX HOURS AS NEEDED as 
needed for PAIN Qty = 60
 
Ondansetron (Zofran Odt) 4 MG ODT SUBLINGUAL EVERY SIX HOURS AS NEEDED as needed
for NAUSEA Qty = 12
 
Oxycodone HCl/Acetaminophen (Percocet 5-325 MG Tablet) 1 EACH TABLET ORAL EVERY 
4-6 HOURS as needed for PAIN Qty = 24
 
Start taking the following new medications:
Nicotine (Nicotine Patch) 7 MG/24 HOUR PATCH.TD24
    1 Patch On the skin DAILY
    Qty = 14
    No Refills
    Comments:
       Last Taken:8/9/18
             Time:0812
 
Gabapentin (Gabapentin) 300 MG CAPSULE
    1 Capsule ORAL EVERY SIX HOURS AS NEEDED as needed for ANXIETY/AGITATION/
INSOMNIA
    Qty = 42
    No Refills
    Comments:
       Last Taken:8/9/18
             Time:0813
 
Escitalopram Oxalate (Lexapro) 10 MG TABLET
    1 Tablet ORAL DAILY @8 AM
    Qty = 14
    No Refills
    Comments:
       Last Taken:8/9/18
             Time:0812
 
Trazodone HCl (Trazodone HCl) 50 MG TABLET
    1 Tablet ORAL AT BEDTIME as needed for SLEEP
    Qty = 14
    No Refills
    Comments:
       Last Taken:8/8/18
             Time:2215
 
Acamprosate Calcium (Acamprosate Calcium) 333 MG TABLET.DR
    2 Tablet ORAL THREE TIMES DAILY
    Qty = 84
    No Refills
    Instructions:
       Campral 666 mg po t.i.d.
    Comments:
       Last Taken:8/9/18
             Time:0812
 
Multivitamin (One Daily Multivitamin) 1 EACH TABLET
    1 Tablet ORAL DAILY
    Qty = 14
    No Refills
    Comments:
       Last Taken:8/9/18
             Time:0811
 
 
Other Inst/Recommendations Stay away from alcohol!  Pursue inpatient rehab.
Studies Pending at Discharge
None.
Copies To:
[facility]

## 2018-08-09 NOTE — SOCIAL WORKER PROG NOTE PSYCH
Social Work Progress Note
Progress Note
This writer met with patient.  She discussed feeling "a little apprehensive" 
about discharging home and what she will say to her family.  She stated that she
is still eager to discharge and feels safe doing so.  Patient denied SI/HI/AH/VH
and identified a safety plan in which she would "call my family or friends or go
to the ER."  She was also agreeable to utilizing the crisis numbers and warm 
lines.  They were provided to her upon discharge today.  Patient expressed 
interest in rehab referrals, however, stated that she would like for referrals 
to be made and that she would go to the IOP intake today.  She stated that she 
would follow up with them by phone.
 
Patient signed PARVIZ's for [facility], [facility] and [facility].  This writer 
spoke with Deana at [facility] to confirmed that they accept her insurance due
to [place].  Deana offered that patient to complete a phone 
screening today. Patient refused and stated that she would call from home.
 
This writer informed [facility] ([facility] and [facility]) of patient's interest in rehab 
and referrals.  They were also informed that a referral may be submitted to [facility] for case management services after completing IOP.
Faxed Referral(s)
   Referred To: [facility]
   Transition of Care Documents sent: Health Summary
   Faxed to: [facility]
   Fax #: [phone]
   Faxed by: AGATHA Nathan LCSW
   Date faxed: 08/09/18
   Time Faxed: [phone]